# Patient Record
Sex: FEMALE | URBAN - METROPOLITAN AREA
[De-identification: names, ages, dates, MRNs, and addresses within clinical notes are randomized per-mention and may not be internally consistent; named-entity substitution may affect disease eponyms.]

---

## 2021-04-16 ENCOUNTER — OFFICE VISIT (OUTPATIENT)
Dept: URBAN - METROPOLITAN AREA CLINIC 76 | Facility: CLINIC | Age: 46
End: 2021-04-16
Payer: COMMERCIAL

## 2021-04-16 DIAGNOSIS — H52.4 PRESBYOPIA: Primary | ICD-10-CM

## 2021-04-16 PROCEDURE — 92004 COMPRE OPH EXAM NEW PT 1/>: CPT | Performed by: OPTOMETRIST

## 2021-04-16 ASSESSMENT — VISUAL ACUITY
OS: 20/20
OD: 20/20

## 2021-04-16 ASSESSMENT — KERATOMETRY
OS: 44.75
OD: 45.13

## 2021-04-16 ASSESSMENT — INTRAOCULAR PRESSURE
OS: 13
OD: 13

## 2021-08-05 NOTE — IMPRESSION/PLAN
Discharge instructions reviewed with patient. Voices understanding. Ambulated without difficulty. Pt discharged to home per order. Transported to car per wheelchair. Impression: Presbyopia: H52.4. Bilateral. Plan: Dispensed Rx for glasses to patient and instructed on normal adaptation period.